# Patient Record
Sex: MALE | Race: WHITE
[De-identification: names, ages, dates, MRNs, and addresses within clinical notes are randomized per-mention and may not be internally consistent; named-entity substitution may affect disease eponyms.]

---

## 2020-07-07 ENCOUNTER — HOSPITAL ENCOUNTER (INPATIENT)
Dept: HOSPITAL 74 - YASAS | Age: 60
LOS: 3 days | Discharge: OTHER INSTITUTION | DRG: 773 | End: 2020-07-10
Attending: ALLERGY & IMMUNOLOGY | Admitting: ALLERGY & IMMUNOLOGY
Payer: COMMERCIAL

## 2020-07-07 VITALS — BODY MASS INDEX: 33 KG/M2

## 2020-07-07 DIAGNOSIS — F13.230: Primary | ICD-10-CM

## 2020-07-07 DIAGNOSIS — F90.9: ICD-10-CM

## 2020-07-07 DIAGNOSIS — B18.2: ICD-10-CM

## 2020-07-07 DIAGNOSIS — F39: ICD-10-CM

## 2020-07-07 DIAGNOSIS — Z56.0: ICD-10-CM

## 2020-07-07 DIAGNOSIS — G47.00: ICD-10-CM

## 2020-07-07 DIAGNOSIS — Z87.828: ICD-10-CM

## 2020-07-07 DIAGNOSIS — F17.210: ICD-10-CM

## 2020-07-07 DIAGNOSIS — F11.20: ICD-10-CM

## 2020-07-07 DIAGNOSIS — B97.35: ICD-10-CM

## 2020-07-07 DIAGNOSIS — F19.282: ICD-10-CM

## 2020-07-07 DIAGNOSIS — F60.2: ICD-10-CM

## 2020-07-07 LAB
ALBUMIN SERPL-MCNC: 3.8 G/DL (ref 3.4–5)
ALP SERPL-CCNC: 86 U/L (ref 45–117)
ALT SERPL-CCNC: 23 U/L (ref 13–61)
ANION GAP SERPL CALC-SCNC: 9 MMOL/L (ref 8–16)
AST SERPL-CCNC: 25 U/L (ref 15–37)
BILIRUB SERPL-MCNC: 0.4 MG/DL (ref 0.2–1)
BUN SERPL-MCNC: 24.6 MG/DL (ref 7–18)
CALCIUM SERPL-MCNC: 9.5 MG/DL (ref 8.5–10.1)
CHLORIDE SERPL-SCNC: 108 MMOL/L (ref 98–107)
CO2 SERPL-SCNC: 24 MMOL/L (ref 21–32)
CREAT SERPL-MCNC: 1.3 MG/DL (ref 0.55–1.3)
DEPRECATED RDW RBC AUTO: 14.2 % (ref 11.9–15.9)
GLUCOSE SERPL-MCNC: 119 MG/DL (ref 74–106)
HCT VFR BLD CALC: 42 % (ref 35.4–49)
HGB BLD-MCNC: 13.8 GM/DL (ref 11.7–16.9)
MCH RBC QN AUTO: 30.6 PG (ref 25.7–33.7)
MCHC RBC AUTO-ENTMCNC: 32.8 G/DL (ref 32–35.9)
MCV RBC: 93.1 FL (ref 80–96)
PLATELET # BLD AUTO: 176 K/MM3 (ref 134–434)
PMV BLD: 10.2 FL (ref 7.5–11.1)
POTASSIUM SERPLBLD-SCNC: 4.1 MMOL/L (ref 3.5–5.1)
PROT SERPL-MCNC: 9 G/DL (ref 6.4–8.2)
RBC # BLD AUTO: 4.51 M/MM3 (ref 4–5.6)
SODIUM SERPL-SCNC: 140 MMOL/L (ref 136–145)
WBC # BLD AUTO: 10.7 K/MM3 (ref 4–10)

## 2020-07-07 PROCEDURE — U0003 INFECTIOUS AGENT DETECTION BY NUCLEIC ACID (DNA OR RNA); SEVERE ACUTE RESPIRATORY SYNDROME CORONAVIRUS 2 (SARS-COV-2) (CORONAVIRUS DISEASE [COVID-19]), AMPLIFIED PROBE TECHNIQUE, MAKING USE OF HIGH THROUGHPUT TECHNOLOGIES AS DESCRIBED BY CMS-2020-01-R: HCPCS

## 2020-07-07 PROCEDURE — HZ2ZZZZ DETOXIFICATION SERVICES FOR SUBSTANCE ABUSE TREATMENT: ICD-10-PCS | Performed by: ALLERGY & IMMUNOLOGY

## 2020-07-07 RX ADMIN — METHADONE HYDROCHLORIDE SCH MG: 40 TABLET ORAL at 15:02

## 2020-07-07 RX ADMIN — HYDROXYZINE PAMOATE SCH MG: 25 CAPSULE ORAL at 15:00

## 2020-07-07 RX ADMIN — NICOTINE SCH MG: 7 PATCH TRANSDERMAL at 15:00

## 2020-07-07 RX ADMIN — Medication SCH MG: at 22:02

## 2020-07-07 RX ADMIN — HYDROXYZINE PAMOATE SCH MG: 25 CAPSULE ORAL at 17:45

## 2020-07-07 RX ADMIN — MAGNESIUM HYDROXIDE PRN ML: 400 SUSPENSION ORAL at 22:01

## 2020-07-07 RX ADMIN — TRAZODONE HYDROCHLORIDE SCH MG: 100 TABLET ORAL at 22:01

## 2020-07-07 RX ADMIN — Medication SCH MG: at 22:01

## 2020-07-07 RX ADMIN — MAGNESIUM HYDROXIDE PRN ML: 400 SUSPENSION ORAL at 15:06

## 2020-07-07 RX ADMIN — HYDROXYZINE PAMOATE SCH MG: 25 CAPSULE ORAL at 22:01

## 2020-07-07 RX ADMIN — Medication SCH TAB: at 15:00

## 2020-07-07 SDOH — ECONOMIC STABILITY - INCOME SECURITY: UNEMPLOYMENT, UNSPECIFIED: Z56.0

## 2020-07-07 NOTE — BHS.RME
Substance Use & Tx History





- Substance Use History


  ** Benzodiazepines


Substance amount: XANAX AND KLONOPIN UP TO 24 TABS 


Frequency of use: Daily


Substance route: Oral


Date of Last Use: 20





  ** Nicotine


Substance amount: 1 PACK


Frequency of use: Daily


Substance route: Smoking


Date of Last Use: 20





Physical/Psych/Mental Status





- Behavior


General Behavior: Increased activity (restlessness, agitation)


Eye Contact: Normal





- Cooperativeness


Cooperativeness: Cooperative





- Thinking


Thought Processes: Tight, Logical, Goal Directed





- Physical Health Problems


Is patient presently having any pain?: No


Does patient presently have any injuries (include location): No


Does patient currently have a fever: No


Is patient pregnant: No





CIWA


Nausea/Vomitin-No Nausea/No Vomiting


Muscle Tremors: 3


Anxiety: 4-Mod. Anxious/Guarded


Agitation: 5


Paroxysmal Sweats: 4-Forehead w/Sweat Beads


Orientation: 1-Uncertain about Date


Tacttile Disturbances: 0-None


Auditory Disturbances: 0-None


Visual Disturbances: 0-None


Headache: 0-None Present


CIWA-Ar Total Score: 17
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
## 2020-07-07 NOTE — EKG
Test Reason : 

Blood Pressure : ***/*** mmHG

Vent. Rate : 069 BPM     Atrial Rate : 069 BPM

   P-R Int : 172 ms          QRS Dur : 080 ms

    QT Int : 432 ms       P-R-T Axes : 043 -32 033 degrees

   QTc Int : 462 ms

 

NORMAL SINUS RHYTHM

LEFT AXIS DEVIATION

ABNORMAL ECG

NO PREVIOUS ECGS AVAILABLE

Confirmed by MD Gera, Ryan ([id number]) on 7/7/2020 2:51:26 PM

 

Referred By:             Confirmed By:Ryan Boss MD
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
## 2020-07-08 RX ADMIN — SUVOREXANT PRN MG: 10 TABLET, FILM COATED ORAL at 22:08

## 2020-07-08 RX ADMIN — Medication SCH TAB: at 10:13

## 2020-07-08 RX ADMIN — SUVOREXANT PRN MG: 10 TABLET, FILM COATED ORAL at 01:38

## 2020-07-08 RX ADMIN — EMTRICITABINE AND TENOFOVIR ALAFENAMIDE SCH EACH: 200; 25 TABLET ORAL at 07:34

## 2020-07-08 RX ADMIN — MAGNESIUM HYDROXIDE PRN ML: 400 SUSPENSION ORAL at 18:39

## 2020-07-08 RX ADMIN — Medication SCH MG: at 22:55

## 2020-07-08 RX ADMIN — Medication SCH MG: at 22:08

## 2020-07-08 RX ADMIN — METHADONE HYDROCHLORIDE SCH MG: 40 TABLET ORAL at 06:24

## 2020-07-08 RX ADMIN — TRAZODONE HYDROCHLORIDE SCH MG: 100 TABLET ORAL at 22:08

## 2020-07-08 RX ADMIN — HYDROXYZINE PAMOATE SCH MG: 25 CAPSULE ORAL at 10:13

## 2020-07-08 RX ADMIN — HYDROXYZINE PAMOATE SCH MG: 25 CAPSULE ORAL at 06:24

## 2020-07-08 RX ADMIN — DOLUTEGRAVIR SODIUM SCH MG: 50 TABLET, FILM COATED ORAL at 07:34

## 2020-07-08 RX ADMIN — NICOTINE SCH MG: 7 PATCH TRANSDERMAL at 10:13

## 2020-07-09 RX ADMIN — Medication SCH MG: at 22:02

## 2020-07-09 RX ADMIN — BISMUTH SUBSALICYLATE PRN MG: 525 LIQUID ORAL at 13:13

## 2020-07-09 RX ADMIN — SUVOREXANT PRN MG: 10 TABLET, FILM COATED ORAL at 22:02

## 2020-07-09 RX ADMIN — Medication SCH MG: at 22:03

## 2020-07-09 RX ADMIN — DOLUTEGRAVIR SODIUM SCH MG: 50 TABLET, FILM COATED ORAL at 07:20

## 2020-07-09 RX ADMIN — NICOTINE SCH MG: 7 PATCH TRANSDERMAL at 10:57

## 2020-07-09 RX ADMIN — Medication SCH TAB: at 10:56

## 2020-07-09 RX ADMIN — EMTRICITABINE AND TENOFOVIR ALAFENAMIDE SCH EACH: 200; 25 TABLET ORAL at 07:20

## 2020-07-09 RX ADMIN — METHADONE HYDROCHLORIDE SCH MG: 40 TABLET ORAL at 05:34

## 2020-07-09 RX ADMIN — BISMUTH SUBSALICYLATE PRN MG: 525 LIQUID ORAL at 17:27

## 2020-07-09 NOTE — PN
[facility] Progress Note


Note: 





Patient reports sleeping poorly despite taking Trazadone 100 mg/hs and Belsomra 

10 mg/hs. Will increase Trazadone to 150 mg/hs

## 2020-07-09 NOTE — PN
S CIWA





- CIWA Score


Nausea/Vomitin-No Nausea/No Vomiting


Muscle Tremors: None


Anxiety: 2


Agitation: 2


Paroxysmal Sweats: No Perspiration


Orientation: 0-Oriented


Tacttile Disturbances: 0-None


Auditory Disturbances: 0-None


Visual Disturbances: 0-None


Headache: 0-None Present


CIWA-Ar Total Score: 4





[facility] Progress Note (SOAP)


Subjective: 





I still cant sleep with the medication the psych has ordered


anxiety


Objective: 





20 11:06


                                   Vital Signs











Temperature  97.7 F   20 08:50


 


Pulse Rate  72   20 08:50


 


Respiratory Rate  18   20 08:50


 


Blood Pressure  122/71   20 08:50


 


O2 Sat by Pulse Oximetry (%)  97   20 05:27








aaox3


lying in bed


no acute distress


Assessment: 





20 11:06


mild withdrawals


Plan: 





continue detox


increase fluids


psych ordered to review current medication.

## 2020-07-10 VITALS — SYSTOLIC BLOOD PRESSURE: 105 MMHG | TEMPERATURE: 98 F | HEART RATE: 64 BPM | DIASTOLIC BLOOD PRESSURE: 73 MMHG

## 2020-07-10 RX ADMIN — DOLUTEGRAVIR SODIUM SCH MG: 50 TABLET, FILM COATED ORAL at 07:28

## 2020-07-10 RX ADMIN — BISMUTH SUBSALICYLATE PRN MG: 525 LIQUID ORAL at 09:23

## 2020-07-10 RX ADMIN — EMTRICITABINE AND TENOFOVIR ALAFENAMIDE SCH EACH: 200; 25 TABLET ORAL at 07:28

## 2020-07-10 RX ADMIN — METHADONE HYDROCHLORIDE SCH MG: 40 TABLET ORAL at 05:37
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
